# Patient Record
Sex: FEMALE | Race: WHITE | URBAN - METROPOLITAN AREA
[De-identification: names, ages, dates, MRNs, and addresses within clinical notes are randomized per-mention and may not be internally consistent; named-entity substitution may affect disease eponyms.]

---

## 2018-02-23 ENCOUNTER — OFFICE VISIT (OUTPATIENT)
Dept: FAMILY MEDICINE CLINIC | Age: 23
End: 2018-02-23

## 2018-02-23 VITALS
WEIGHT: 150.8 LBS | HEART RATE: 77 BPM | TEMPERATURE: 98.7 F | RESPIRATION RATE: 16 BRPM | DIASTOLIC BLOOD PRESSURE: 80 MMHG | HEIGHT: 64 IN | SYSTOLIC BLOOD PRESSURE: 122 MMHG | BODY MASS INDEX: 25.74 KG/M2 | OXYGEN SATURATION: 98 %

## 2018-02-23 DIAGNOSIS — F41.0 PANIC DISORDER: Primary | ICD-10-CM

## 2018-02-23 DIAGNOSIS — L30.9 ECZEMA, UNSPECIFIED TYPE: ICD-10-CM

## 2018-02-23 DIAGNOSIS — E66.3 OVERWEIGHT (BMI 25.0-29.9): ICD-10-CM

## 2018-02-23 RX ORDER — VENLAFAXINE HYDROCHLORIDE 37.5 MG/1
37.5 CAPSULE, EXTENDED RELEASE ORAL DAILY
Qty: 30 CAP | Refills: 0 | Status: SHIPPED | OUTPATIENT
Start: 2018-02-23 | End: 2018-03-12 | Stop reason: SDUPTHER

## 2018-02-23 RX ORDER — TRIAMCINOLONE ACETONIDE 1 MG/G
CREAM TOPICAL
Qty: 15 G | Refills: 3 | Status: SHIPPED | OUTPATIENT
Start: 2018-02-23

## 2018-02-23 RX ORDER — ALPRAZOLAM 0.25 MG/1
0.25 TABLET ORAL
Qty: 45 TAB | Refills: 0 | Status: SHIPPED | OUTPATIENT
Start: 2018-02-23

## 2018-02-23 NOTE — PATIENT INSTRUCTIONS
Begin Venlafaxine 37.5 mg daily  Alprazolam 0.25 mg up to 3 x daily only if needed for anxiety  Return for follow up in 3 weeks, sooner with any problems.   Referral to [address]  Triamcinolone - Apply sparingly to affected areas twice daily, do not use for more than 14 days consecutively without a break

## 2018-02-23 NOTE — PROGRESS NOTES
HISTORY OF PRESENT ILLNESS  Heraclio Norman is a 23 y.o. female. HPI Comments: Ms. Alvino Mancini was seen here in 3/2016 with anxiety/panic symptoms, started on sertraline and alprazolam and referred for psychiatry evaluation. She now reports that the sertraline might have been associated with a rash on her legs and she was not comfortable at the psychiatry practice. She was subsequently seen by the psychiatrist at [facility] and was treated with escitalopram and alprazolam. She found the alprazolam helpful but felt like she was \"numb\" on escitalopram and found herself contemplating driving into walls or stepping off high places and stopped it. She has graduated from college and is no longer eligable for psychiatric care there. She has been taking alprazolam about 3 days each week and is about to run out. Dry Skin   The history is provided by the patient and medical records. This is a recurrent problem. Associated symptoms include shortness of breath (with panic attacks). Pertinent negatives include no headaches. Anxiety   The history is provided by the patient and medical records. This is a recurrent problem. Associated symptoms include shortness of breath (with panic attacks). Pertinent negatives include no headaches. Review of Systems   Constitutional: Negative for fever and weight loss. Eyes:        \"tunnel vision\" with panic attacks   Respiratory: Positive for shortness of breath (with panic attacks). Cardiovascular: Positive for palpitations (with panic attacks). Skin: Positive for dry skin and rash (eczema). Neurological: Negative for headaches. Psychiatric/Behavioral: Negative for depression and suicidal ideas. The patient is nervous/anxious (panic). The patient does not have insomnia.       Visit Vitals    /80 (BP 1 Location: Left arm, BP Patient Position: Sitting)    Pulse 77    Temp 98.7 °F (37.1 °C) (Oral)    Resp 16    Ht 5' 4\" (1.626 m)    Wt 150 lb 12.8 oz (68.4 kg)    SpO2 98%  BMI 25.88 kg/m2     Physical Exam   Constitutional: She is oriented to person, place, and time. She appears well-developed and well-nourished. 32 lb weight gain since 3/2016   HENT:   Head: Normocephalic. Right Ear: Tympanic membrane and ear canal normal.   Left Ear: Tympanic membrane and ear canal normal.   Mouth/Throat: Oropharynx is clear and moist.   Eyes: Conjunctivae and EOM are normal.   Neck: Neck supple. Cardiovascular: Normal rate, regular rhythm and normal heart sounds. Pulmonary/Chest: Effort normal and breath sounds normal.   Lymphadenopathy:     She has no cervical adenopathy. Neurological: She is alert and oriented to person, place, and time. Skin: Skin is warm and dry. Rash (isolated patches of eczema) noted. Psychiatric: She has a normal mood and affect. Her behavior is normal.   Nursing note and vitals reviewed. ASSESSMENT and PLAN    ICD-10-CM ICD-9-CM    1. Panic disorder F41.0 300.01 venlafaxine-SR (EFFEXOR-XR) 37.5 mg capsule      ALPRAZolam (XANAX) 0.25 mg tablet      REFERRAL TO PSYCHIATRY   2. Eczema, unspecified type L30.9 692.9 triamcinolone acetonide (KENALOG) 0.1 % topical cream   3. Overweight (BMI 25.0-29. 9) E66.3 278.02    Begin Venlafaxine 37.5 mg daily  [facility] reviewed with no information indicating activity of concern. Alprazolam 0.25 mg up to 3 x daily only if needed for anxiety  Return for follow up in 3 weeks, sooner with any problems. Referral to [facility]  Triamcinolone - Apply sparingly to affected areas twice daily, do not use for more than 14 days consecutively without a break  Avoid dietary starch and sugar and follow a program of regular aerobic exercise.

## 2018-02-23 NOTE — PROGRESS NOTES
Go Diallo is a 23 y.o. female here for eczema    Go Diallo is a 23 y.o. female (: 1995) presenting to address:    Chief Complaint   Patient presents with    Dry Skin     pt states for a few weeks she's had break outs    Anxiety     pt states she has concerns with anxiety       Vitals:    18 0948   BP: 122/80   Pulse: 77   Resp: 16   SpO2: 98%   Weight: 150 lb 12.8 oz (68.4 kg)   Height: 5' 4\" (1.626 m)   PainSc:   0 - No pain       Hearing/Vision:   No exam data present    Learning Assessment:     Learning Assessment 3/18/2016   PRIMARY LEARNER Patient   HIGHEST LEVEL OF EDUCATION - PRIMARY LEARNER  SOME COLLEGE   BARRIERS PRIMARY LEARNER NONE   CO-LEARNER CAREGIVER No   PRIMARY LANGUAGE ENGLISH   LEARNER PREFERENCE PRIMARY VIDEOS   ANSWERED BY patient   RELATIONSHIP SELF     Depression Screening:     PHQ over the last two weeks 3/18/2016   Little interest or pleasure in doing things Nearly every day   Feeling down, depressed or hopeless Nearly every day   Total Score PHQ 2 6     Fall Risk Assessment:   No flowsheet data found. Abuse Screening:   No flowsheet data found. Coordination of Care Questionaire:   1. Have you been to the ER, urgent care clinic since your last visit? Hospitalized since your last visit? YES Urgent care     2. Have you seen or consulted any other health care providers outside of the [address] [place] [address] since your last visit? Include any pap smears or colon screening. NO    Advanced Directive:   1. Do you have an Advanced Directive? NO    2. Would you like information on Advanced Directives?  NO

## 2018-02-23 NOTE — MR AVS SNAPSHOT
[address] [facility] [address] 
[phone] Patient: Tonie Lesch MRN: [id number] QTZ:[birth year] Visit Information Date & Time Provider Department Dept. Phone Encounter #  
 2/23/2018 10:00 AM Lu Joseph, [employer] [phone] [id number] Upcoming Health Maintenance Date Due  
 HPV AGE 9Y-34Y (1 of 3 - Female 3 Dose Series) 4/7/2004 DTaP/Tdap/Td series (1 - Tdap) 4/7/2014 PAP AKA CERVICAL CYTOLOGY 4/7/2014 Allergies as of 2/23/2018  Review Complete On: 2/23/2018 By: Lu Joseph MD  
  
 Severity Noted Reaction Type Reactions Citrus And Derivatives  03/18/2016    Rash Current Immunizations  Never Reviewed Name Date Influenza Vaccine 9/5/2017 Not reviewed this visit You Were Diagnosed With   
  
 Codes Comments Panic disorder    -  Primary ICD-10-CM: F41.0 ICD-9-CM: 300.01 Eczema, unspecified type     ICD-10-CM: L30.9 ICD-9-CM: 692.9 Overweight (BMI 25.0-29. 9)     ICD-10-CM: W08.6 ICD-9-CM: 278.02 Vitals BP Pulse Temp Resp Height(growth percentile) Weight(growth percentile) 122/80 (BP 1 Location: Left arm, BP Patient Position: Sitting) 77 98.7 °F (37.1 °C) (Oral) 16 5' 4\" (1.626 m) 150 lb 12.8 oz (68.4 kg) SpO2 BMI Smoking Status 98% 25.88 kg/m2 Never Smoker Vitals History BMI and BSA Data Body Mass Index Body Surface Area  
 25.88 kg/m 2 1.76 m 2 Preferred Pharmacy Pharmacy Name Phone [facility] [address] [place] [address] 263. [phone] Your Updated Medication List  
  
   
This list is accurate as of 2/23/18 10:23 AM.  Always use your most recent med list.  
  
  
  
  
 ALPRAZolam 0.25 mg tablet Commonly known as:  Deon Laboy Take 1 Tab by mouth three (3) times daily as needed for Anxiety. Max Daily Amount: 0.75 mg. CLARITIN-D 12 HOUR 5-120 mg per tablet Generic drug:  loratadine-pseudoephedrine Take 1 Tab by mouth two (2) times a day. triamcinolone acetonide 0.1 % topical cream  
Commonly known as:  KENALOG Apply sparingly to affected areas twice daily, do not use for more than 14 days consecutively without a break use thin layer  
  
 venlafaxine-SR 37.5 mg capsule Commonly known as:  EFFEXOR-XR Take 1 Cap by mouth daily. Prescriptions Printed Refills ALPRAZolam (XANAX) 0.25 mg tablet 0 Sig: Take 1 Tab by mouth three (3) times daily as needed for Anxiety. Max Daily Amount: 0.75 mg. Class: Print Route: Oral  
 triamcinolone acetonide (KENALOG) 0.1 % topical cream 3 Sig: Apply sparingly to affected areas twice daily, do not use for more than 14 days consecutively without a break 
use thin layer Class: Print Prescriptions Sent to Pharmacy Refills  
 venlafaxine-SR (EFFEXOR-XR) 37.5 mg capsule 0 Sig: Take 1 Cap by mouth daily. Class: Normal  
 Pharmacy: [facility] DR SHLOMO BOSTON [address] [place] [address].  #: [phone] Route: Oral  
  
We Performed the Following REFERRAL TO PSYCHIATRY [REF91 Custom] Comments:  
 Please evaluate patient for anxiety/panic disorder - [facility] Referral Information Referral ID Referred By Referred To  
  
 [id number] Collin Rios Not Available Visits Status Start Date End Date 1 New Request 2/23/18 2/23/19 If your referral has a status of pending review or denied, additional information will be sent to support the outcome of this decision. Patient Instructions Begin Venlafaxine 37.5 mg daily Alprazolam 0.25 mg up to 3 x daily only if needed for anxiety Return for follow up in 3 weeks, sooner with any problems. Referral to [address] Triamcinolone - Apply sparingly to affected areas twice daily, do not use for more than 14 days consecutively without a break Introducing [address]! Esdras Hale introduces [employer] patient portal. Now you can access parts of your medical record, email your doctor's office, and request medication refills online. 1. In your internet browser, go to [email or web address] 2. Click on the First Time User? Click Here link in the Sign In box. You will see the New Member Sign Up page. 3. Enter your Rew[employer]Me Access Code exactly as it appears below. You will not need to use this code after youve completed the sign-up process. If you do not sign up before the expiration date, you must request a new code. · [employer]Me Access Code: [email or web address] Expires: 5/24/2018 10:17 AM 
 
4. Enter the last four digits of your Social Security Number (xxxx) and Date of Birth (mm/dd/yyyy) as indicated and click Submit. You will be taken to the next sign-up page. 5. Create a RewardMe ID. This will be your RewardMe login ID and cannot be changed, so think of one that is secure and easy to remember. 6. Create a Rew[employer]Me password. You can change your password at any time. 7. Enter your Password Reset Question and Answer. This can be used at a later time if you forget your password. 8. Enter your e-mail address. You will receive e-mail notification when new information is available in [address] 9. Click Sign Up. You can now view and download portions of your medical record. 10. Click the Download Summary menu link to download a portable copy of your medical information. If you have questions, please visit the Frequently Asked Questions section of the [employer]Me website. Remember, [employer]Me is NOT to be used for urgent needs. For medical emergencies, dial 911. Now available from your iPhone and Android! Please provide this summary of care documentation to your next provider. Your primary care clinician is listed as Roshni Ayala.  If you have any questions after today's visit, please call [phone].

## 2018-03-12 ENCOUNTER — OFFICE VISIT (OUTPATIENT)
Dept: FAMILY MEDICINE CLINIC | Age: 23
End: 2018-03-12

## 2018-03-12 VITALS
HEIGHT: 64 IN | SYSTOLIC BLOOD PRESSURE: 104 MMHG | WEIGHT: 150.4 LBS | BODY MASS INDEX: 25.68 KG/M2 | HEART RATE: 89 BPM | TEMPERATURE: 98.3 F | OXYGEN SATURATION: 98 % | DIASTOLIC BLOOD PRESSURE: 82 MMHG | RESPIRATION RATE: 16 BRPM

## 2018-03-12 DIAGNOSIS — Z87.898 HISTORY OF NIGHT SWEATS: Primary | ICD-10-CM

## 2018-03-12 DIAGNOSIS — F41.0 PANIC DISORDER: ICD-10-CM

## 2018-03-12 RX ORDER — VENLAFAXINE HYDROCHLORIDE 37.5 MG/1
37.5 CAPSULE, EXTENDED RELEASE ORAL DAILY
Qty: 90 CAP | Refills: 1 | Status: SHIPPED | OUTPATIENT
Start: 2018-03-12

## 2018-03-12 NOTE — MR AVS SNAPSHOT
Amadou Ca 
 
 
 [address] [facility] [id number] [phone] Patient: Alpesh Zheng MRN: [id number] RCN:8[birth year]2/[birth year]324 Visit Information Date & Time Provider Department Dept. Phone Encounter #  
 3/12/2018 10:00 AM Paloma Short, [employer] [phone] [id number] Upcoming Health Maintenance Date Due  
 HPV AGE 9Y-34Y (1 of 3 - Female 3 Dose Series) 4/7/2004 DTaP/Tdap/Td series (1 - Tdap) 4/7/2014 PAP AKA CERVICAL CYTOLOGY 4/7/2014 Allergies as of 3/12/2018  Review Complete On: 3/12/2018 By: Paloma Short MD  
  
 Severity Noted Reaction Type Reactions Citrus And Derivatives  03/18/2016    Rash Current Immunizations  Never Reviewed Name Date Influenza Vaccine 9/5/2017 Not reviewed this visit You Were Diagnosed With   
  
 Codes Comments History of night sweats    -  Primary ICD-10-CM: K75.701 ICD-9-CM: V15.89 Panic disorder     ICD-10-CM: F41.0 ICD-9-CM: 300.01 Vitals BP Pulse Temp Resp Height(growth percentile) Weight(growth percentile) 104/82 (BP 1 Location: Left arm, BP Patient Position: Sitting) 89 98.3 °F (36.8 °C) (Oral) 16 5' 4\" (1.626 m) 150 lb 6.4 oz (68.2 kg) SpO2 BMI Smoking Status 98% 25.82 kg/m2 Never Smoker BMI and BSA Data Body Mass Index Body Surface Area  
 25.82 kg/m 2 1.75 m 2 Preferred Pharmacy Pharmacy Name Phone [facility] [address] [place] [address] 263. [phone] Your Updated Medication List  
  
   
This list is accurate as of 3/12/18 10:24 AM.  Always use your most recent med list.  
  
  
  
  
 ALPRAZolam 0.25 mg tablet Commonly known as:  Dayla Bake Take 1 Tab by mouth three (3) times daily as needed for Anxiety. Max Daily Amount: 0.75 mg. CLARITIN-D 12 HOUR 5-120 mg per tablet Generic drug:  loratadine-pseudoephedrine Take 1 Tab by mouth two (2) times a day. triamcinolone acetonide 0.1 % topical cream  
Commonly known as:  KENALOG Apply sparingly to affected areas twice daily, do not use for more than 14 days consecutively without a break use thin layer  
  
 venlafaxine-SR 37.5 mg capsule Commonly known as:  EFFEXOR-XR Take 1 Cap by mouth daily. Prescriptions Sent to Pharmacy Refills  
 venlafaxine-SR (EFFEXOR-XR) 37.5 mg capsule 1 Sig: Take 1 Cap by mouth daily. Class: Normal  
 Pharmacy: [facility] [address] SHLOMO BOSTON [address] [facility], [address] [facility] [address] [address].  #: [phone] Route: Oral  
  
Patient Instructions Continue current medications. Return for follow up in 6 months, sooner with any problems. Introducing [facility]! Angelica Polanco introduces [employer] patient portal. Now you can access parts of your medical record, email your doctor's office, and request medication refills online. 1. In your internet browser, go to [email or web address] 2. Click on the First Time User? Click Here link in the Sign In box. You will see the New Member Sign Up page. 3. Enter your [employer] Access Code exactly as it appears below. You will not need to use this code after youve completed the sign-up process. If you do not sign up before the expiration date, you must request a new code. · [employer] Access Code: [email or web address] Expires: 5/24/2018 11:17 AM 
 
4. Enter the last four digits of your Social Security Number (xxxx) and Date of Birth (mm/dd/yyyy) as indicated and click Submit. You will be taken to the next sign-up page. 5. Create a [employer] ID. This will be your [employer] login ID and cannot be changed, so think of one that is secure and easy to remember. 6. Create a [employer] password. You can change your password at any time. 7. Enter your Password Reset Question and Answer. This can be used at a later time if you forget your password. 8. Enter your e-mail address. You will receive e-mail notification when new information is available in [address] 9. Click Sign Up. You can now view and download portions of your medical record. 10. Click the Download Summary menu link to download a portable copy of your medical information. If you have questions, please visit the Frequently Asked Questions section of the [employer] website. Remember, [employer] is NOT to be used for urgent needs. For medical emergencies, dial 911. Now available from your iPhone and Android! Please provide this summary of care documentation to your next provider. Your primary care clinician is listed as Todd Nixon. If you have any questions after today's visit, please call [phone].

## 2018-03-12 NOTE — PROGRESS NOTES
HISTORY OF PRESENT ILLNESS  Marialuisa Oliveira is a 23 y.o. female. Anxiety   The history is provided by the patient and medical records. This is a chronic problem. Pertinent negatives include no chest pain, no abdominal pain and no headaches. Patient Active Problem List   Diagnosis Code    Panic disorder F41.0    Family history of breast cancer in mother Dx age 45 Z80.2   [facility] Overweight (BMI 25.0-29. 9) E66.3       Current Outpatient Prescriptions:     loratadine-pseudoephedrine (CLARITIN-D 12 HOUR) 5-120 mg per tablet, Take 1 Tab by mouth two (2) times a day., Disp: , Rfl:     venlafaxine-SR (EFFEXOR-XR) 37.5 mg capsule, Take 1 Cap by mouth daily. , Disp: 30 Cap, Rfl: 0    ALPRAZolam (XANAX) 0.25 mg tablet, Take 1 Tab by mouth three (3) times daily as needed for Anxiety. Max Daily Amount: 0.75 mg., Disp: 45 Tab, Rfl: 0    triamcinolone acetonide (KENALOG) 0.1 % topical cream, Apply sparingly to affected areas twice daily, do not use for more than 14 days consecutively without a break use thin layer, Disp: 15 g, Rfl: 3    Allergies   Allergen Reactions    Citrus And Derivatives Rash         Review of Systems   Constitutional: Negative for fever and weight loss. Reports frequent night sweats x 2 years. Has had a negative PPD since, denies symptoms of infection, reports decreased frequency of symptoms since starting venlafaxine. Cardiovascular: Negative for chest pain and palpitations. Gastrointestinal: Negative for abdominal pain and nausea. Neurological: Negative for dizziness and headaches. Psychiatric/Behavioral: Negative for depression. The patient is nervous/anxious (much less anxiety/panic symptoms on venlafaxine - only felt anxious during an argument since starting venlafaxine, no longer needing alprazolam).       Visit Vitals    /82 (BP 1 Location: Left arm, BP Patient Position: Sitting)    Pulse 89    Temp 98.3 °F (36.8 °C) (Oral)    Resp 16    Ht 5' 4\" (1.626 m)    Wt 150 lb 6.4 oz (68.2 kg)    SpO2 98%    BMI 25.82 kg/m2       Physical Exam   Constitutional: She is oriented to person, place, and time. She appears well-developed and well-nourished. HENT:   Head: Normocephalic. Eyes: EOM are normal.   Neck: Neck supple. Cardiovascular: Normal rate. Pulmonary/Chest: Effort normal.   Musculoskeletal: She exhibits no edema. Neurological: She is alert and oriented to person, place, and time. Skin: Skin is warm and dry. Psychiatric: She has a normal mood and affect. Her behavior is normal.   Nursing note and vitals reviewed. ASSESSMENT and PLAN    ICD-10-CM ICD-9-CM    1. History of night sweats Z87.898 V15.89    2. Panic disorder F41.0 300.01 venlafaxine-SR (EFFEXOR-XR) 37.5 mg capsule   Continue current medications. Return for follow up in 6 months, sooner with any problems.

## 2018-03-12 NOTE — PROGRESS NOTES
Parke Lombard is a 23 y.o. female here for follow up      Parke Lombard is a 23 y.o. female (: 1995) presenting to address:    Chief Complaint   Patient presents with    Anxiety     pt states she's here for a follow up. meds zane well        Vitals:    18 1000   BP: 104/82   Pulse: 89   Resp: 16   Temp: 98.3 °F (36.8 °C)   TempSrc: Oral   SpO2: 98%   Weight: 150 lb 6.4 oz (68.2 kg)   Height: 5' 4\" (1.626 m)   PainSc:   0 - No pain       Hearing/Vision:   No exam data present    Learning Assessment:     Learning Assessment 3/18/2016   PRIMARY LEARNER Patient   HIGHEST LEVEL OF EDUCATION - PRIMARY LEARNER  SOME COLLEGE   BARRIERS PRIMARY LEARNER NONE   CO-LEARNER CAREGIVER No   PRIMARY LANGUAGE ENGLISH   LEARNER PREFERENCE PRIMARY VIDEOS   ANSWERED BY patient   RELATIONSHIP SELF     Depression Screening:     PHQ over the last two weeks 3/18/2016   Little interest or pleasure in doing things Nearly every day   Feeling down, depressed or hopeless Nearly every day   Total Score PHQ 2 6     Fall Risk Assessment:   No flowsheet data found. Abuse Screening:   No flowsheet data found. Coordination of Care Questionaire:   1. Have you been to the ER, urgent care clinic since your last visit? Hospitalized since your last visit? NO    2. Have you seen or consulted any other health care providers outside of the [facility] [place] since your last visit? Include any pap smears or colon screening. NO    Advanced Directive:   1. Do you have an Advanced Directive? NO    2. Would you like information on Advanced Directives?  NO

## 2021-03-09 ENCOUNTER — APPOINTMENT (OUTPATIENT)
Age: 26
Setting detail: DERMATOLOGY
End: 2021-03-17

## 2021-03-09 DIAGNOSIS — R21 RASH AND OTHER NONSPECIFIC SKIN ERUPTION: ICD-10-CM

## 2021-03-09 DIAGNOSIS — D485 NEOPLASM OF UNCERTAIN BEHAVIOR OF SKIN: ICD-10-CM

## 2021-03-09 PROBLEM — D48.5 NEOPLASM OF UNCERTAIN BEHAVIOR OF SKIN: Status: ACTIVE | Noted: 2021-03-09

## 2021-03-09 PROCEDURE — OTHER DEFER: OTHER

## 2021-03-09 PROCEDURE — OTHER OBSERVATION: OTHER

## 2021-03-09 PROCEDURE — OTHER COUNSELING: OTHER

## 2021-03-09 PROCEDURE — 99203 OFFICE O/P NEW LOW 30 MIN: CPT

## 2021-03-09 PROCEDURE — OTHER ADDITIONAL NOTES: OTHER

## 2021-03-09 PROCEDURE — OTHER TREATMENT REGIMEN: OTHER

## 2021-03-09 ASSESSMENT — LOCATION DETAILED DESCRIPTION DERM
LOCATION DETAILED: LEFT ELBOW
LOCATION DETAILED: RIGHT ELBOW
LOCATION DETAILED: RIGHT PROXIMAL POSTERIOR UPPER ARM

## 2021-03-09 ASSESSMENT — LOCATION ZONE DERM: LOCATION ZONE: ARM

## 2021-03-09 ASSESSMENT — LOCATION SIMPLE DESCRIPTION DERM
LOCATION SIMPLE: LEFT ELBOW
LOCATION SIMPLE: RIGHT ELBOW
LOCATION SIMPLE: RIGHT POSTERIOR UPPER ARM

## 2021-03-09 NOTE — PROCEDURE: ADDITIONAL NOTES
Detail Level: Simple
Additional Notes: Right upper arm - lesion present for about 2 years without changes in size. Pt denies symptoms. Pt reports occasionally the spot will be slightly flakey. I discussed diff. Diagnoses with pt and offered bx for a more definitive diagnosis but pt declined today and prefers to monitor spot. Plan to reevaluate the spot in 2-3 months, sooner with any changes.
Render Risk Assessment In Note?: no

## 2021-03-09 NOTE — PROCEDURE: TREATMENT REGIMEN
Continue Regimen: Continue clobetasol cream bid prn to affected areas, do not use for longer than 2 weeks at a time.
Detail Level: Zone
Plan: Rash present on elbows today. Pt reports she previously had this rash on her ears and forehead but cleared within days of starting clobetasol cream prescribed by PCP. Pt also had rash on the back of her neck but not thought it was eczema. Pt also reports joint pain in her hips but pt has scoliosis so unsure if related. I noticed a few pits in her fingernails which leads me to favor psoriasis today. Pt denies a family hx of psoriasis. I discussed diagnosis with pt. Recommend starting clobetasol bid prn to affected areas on elbows, do not use for extended periods of time due to risk of skin atrophy with overuse. Stressed importance of avoiding clobetasol on the face, if face flares again recommend using OTC hydrocortisone or calling our office and we can send mometasone cream for pt. Also recommend pt follow up with rheumatologist for further evaluation of joint pain, discussed possibility of psoriatic arthritis. Follow up in 2-3 months, sooner with any changes.

## 2021-06-10 ENCOUNTER — APPOINTMENT (OUTPATIENT)
Age: 26
Setting detail: DERMATOLOGY
End: 2021-06-11

## 2021-06-10 DIAGNOSIS — Z71.89 OTHER SPECIFIED COUNSELING: ICD-10-CM

## 2021-06-10 DIAGNOSIS — D22 MELANOCYTIC NEVI: ICD-10-CM

## 2021-06-10 DIAGNOSIS — L60.8 OTHER NAIL DISORDERS: ICD-10-CM

## 2021-06-10 DIAGNOSIS — L40.0 PSORIASIS VULGARIS: ICD-10-CM

## 2021-06-10 DIAGNOSIS — L81.4 OTHER MELANIN HYPERPIGMENTATION: ICD-10-CM

## 2021-06-10 PROBLEM — D22.0 MELANOCYTIC NEVI OF LIP: Status: ACTIVE | Noted: 2021-06-10

## 2021-06-10 PROBLEM — D22.72 MELANOCYTIC NEVI OF LEFT LOWER LIMB, INCLUDING HIP: Status: ACTIVE | Noted: 2021-06-10

## 2021-06-10 PROCEDURE — OTHER SUNSCREEN RECOMMENDATIONS: OTHER

## 2021-06-10 PROCEDURE — OTHER COUNSELING: TOPICAL STEROIDS: OTHER

## 2021-06-10 PROCEDURE — OTHER RECOMMENDATIONS: OTHER

## 2021-06-10 PROCEDURE — OTHER MIPS QUALITY: OTHER

## 2021-06-10 PROCEDURE — OTHER REASSURANCE: OTHER

## 2021-06-10 PROCEDURE — OTHER PRESCRIPTION: OTHER

## 2021-06-10 PROCEDURE — OTHER COUNSELING: OTHER

## 2021-06-10 PROCEDURE — 99213 OFFICE O/P EST LOW 20 MIN: CPT

## 2021-06-10 RX ORDER — CLOBETASOL PROPIONATE 0.5 MG/G
CREAM TOPICAL
Qty: 1 | Refills: 2 | Status: ERX | COMMUNITY
Start: 2021-06-10

## 2021-06-10 ASSESSMENT — LOCATION SIMPLE DESCRIPTION DERM
LOCATION SIMPLE: LEFT LIP
LOCATION SIMPLE: LEFT ELBOW
LOCATION SIMPLE: LEFT RING FINGERNAIL
LOCATION SIMPLE: LEFT THUMBNAIL
LOCATION SIMPLE: RIGHT RING FINGERNAIL
LOCATION SIMPLE: RIGHT ELBOW
LOCATION SIMPLE: RIGHT SMALL FINGERNAIL
LOCATION SIMPLE: LEFT KNEE
LOCATION SIMPLE: LEFT MIDDLE FINGERNAIL
LOCATION SIMPLE: LEFT FOREARM
LOCATION SIMPLE: LEFT THIGH
LOCATION SIMPLE: RIGHT INDEX FINGERNAIL
LOCATION SIMPLE: RIGHT FOREARM
LOCATION SIMPLE: RIGHT THUMBNAIL

## 2021-06-10 ASSESSMENT — LOCATION DETAILED DESCRIPTION DERM
LOCATION DETAILED: RIGHT SMALL FINGERNAIL
LOCATION DETAILED: LEFT MIDDLE FINGERNAIL
LOCATION DETAILED: RIGHT RING FINGERNAIL
LOCATION DETAILED: LEFT PROXIMAL DORSAL FOREARM
LOCATION DETAILED: LEFT MEDIAL KNEE
LOCATION DETAILED: LEFT RING FINGERNAIL
LOCATION DETAILED: LEFT ELBOW
LOCATION DETAILED: RIGHT INDEX FINGERNAIL
LOCATION DETAILED: LEFT THUMBNAIL
LOCATION DETAILED: LEFT LOWER CUTANEOUS LIP
LOCATION DETAILED: RIGHT DISTAL DORSAL FOREARM
LOCATION DETAILED: LEFT ANTERIOR LATERAL DISTAL THIGH
LOCATION DETAILED: RIGHT THUMBNAIL
LOCATION DETAILED: RIGHT ELBOW

## 2021-06-10 ASSESSMENT — BSA PSORIASIS: % BODY COVERED IN PSORIASIS: 0

## 2021-06-10 ASSESSMENT — LOCATION ZONE DERM
LOCATION ZONE: FINGERNAIL
LOCATION ZONE: LEG
LOCATION ZONE: ARM
LOCATION ZONE: LIP

## 2021-06-10 NOTE — PROCEDURE: MIPS QUALITY
Quality 154 Part B: Falls: Risk Screening (Should Be Reported With Measure 155.): Patient screened for future fall risk; documentation of no falls in the past year or only one fall without injury in the past year
Quality 154 Part A: Falls: Risk Assessment (Should Be Reported With Measure 155.): Falls risk assessment completed and documented in the past 12 months.
Quality 317: Preventative Care And Screening: Screening For High Blood Pressure And Follow-Up Documented: Patient refuses to participate (either BP measurement or follow-up).
Quality 400a: One-Time Screening For Hepatitis C Virus (Hcv) For All Patients: Patient received one-time screening for HCV infection
Quality 358: Patient-Centered Surgical Risk Assessment And Communication: Documentation of patient-specific risk assessment with a risk calculator based on multi-institutional clinical data, the specific risk calculator used, and communication of risk assessment from risk calculator with the patient or family.
Quality 134: Screening For Clinical Depression And Follow-Up Plan: The patient was screened for depression and the screen was negative and no follow up required
Quality 128: Preventive Care And Screening: Body Mass Index (Bmi) Screening And Follow-Up Plan: BMI not documented, reason not otherwise specified.
Detail Level: Detailed
Quality 130: Documentation Of Current Medications In The Medical Record: Current Medications Documented
Quality 265: Biopsy Follow-Up: Biopsy results reviewed, communicated, tracked, and documented
Quality 110: Preventive Care And Screening: Influenza Immunization: Influenza Immunization previously received during influenza season
Quality 226: Preventive Care And Screening: Tobacco Use: Screening And Cessation Intervention: Patient screened for tobacco use and is an ex/non-smoker
Quality 431: Preventive Care And Screening: Unhealthy Alcohol Use - Screening: Patient screened for unhealthy alcohol use using a single question and scores less than 2 times per year

## 2021-06-10 NOTE — PROCEDURE: RECOMMENDATIONS
Recommendation Preamble: The following recommendations were made during the visit:
Recommendations (Free Text): Per pt she had the clobetasol 0.025% cream that she was prescribed by another provided but it is non-formulary so will send clobetasol 0.05% cream as pt needs a refill. Pt reports she is applying this less than once a week.
Detail Level: Zone
Render Risk Assessment In Note?: no

## 2021-10-12 ENCOUNTER — APPOINTMENT (OUTPATIENT)
Age: 26
Setting detail: DERMATOLOGY
End: 2021-10-12

## 2021-10-12 DIAGNOSIS — L81.4 OTHER MELANIN HYPERPIGMENTATION: ICD-10-CM

## 2021-10-12 DIAGNOSIS — L40.0 PSORIASIS VULGARIS: ICD-10-CM

## 2021-10-12 DIAGNOSIS — L60.8 OTHER NAIL DISORDERS: ICD-10-CM

## 2021-10-12 DIAGNOSIS — Z71.89 OTHER SPECIFIED COUNSELING: ICD-10-CM

## 2021-10-12 PROBLEM — D23.61 OTHER BENIGN NEOPLASM OF SKIN OF RIGHT UPPER LIMB, INCLUDING SHOULDER: Status: ACTIVE | Noted: 2021-10-12

## 2021-10-12 PROCEDURE — OTHER REASSURANCE: OTHER

## 2021-10-12 PROCEDURE — OTHER DEFER: OTHER

## 2021-10-12 PROCEDURE — OTHER COUNSELING: TOPICAL STEROIDS: OTHER

## 2021-10-12 PROCEDURE — OTHER SUNSCREEN RECOMMENDATIONS: OTHER

## 2021-10-12 PROCEDURE — OTHER COUNSELING: OTHER

## 2021-10-12 PROCEDURE — 99213 OFFICE O/P EST LOW 20 MIN: CPT

## 2021-10-12 PROCEDURE — OTHER MIPS QUALITY: OTHER

## 2021-10-12 PROCEDURE — OTHER RECOMMENDATIONS: OTHER

## 2021-10-12 PROCEDURE — OTHER OBSERVATION: OTHER

## 2021-10-12 ASSESSMENT — LOCATION SIMPLE DESCRIPTION DERM
LOCATION SIMPLE: RIGHT FOREARM
LOCATION SIMPLE: LEFT ELBOW
LOCATION SIMPLE: RIGHT ELBOW
LOCATION SIMPLE: LEFT FOREARM
LOCATION SIMPLE: LEFT INDEX FINGERNAIL
LOCATION SIMPLE: LEFT MIDDLE FINGERNAIL

## 2021-10-12 ASSESSMENT — LOCATION DETAILED DESCRIPTION DERM
LOCATION DETAILED: LEFT INDEX FINGERNAIL
LOCATION DETAILED: LEFT PROXIMAL DORSAL FOREARM
LOCATION DETAILED: LEFT ELBOW
LOCATION DETAILED: LEFT MIDDLE FINGERNAIL
LOCATION DETAILED: RIGHT DISTAL DORSAL FOREARM
LOCATION DETAILED: RIGHT ELBOW

## 2021-10-12 ASSESSMENT — BSA PSORIASIS: % BODY COVERED IN PSORIASIS: 0

## 2021-10-12 ASSESSMENT — LOCATION ZONE DERM
LOCATION ZONE: ARM
LOCATION ZONE: FINGERNAIL

## 2021-10-12 NOTE — PROCEDURE: RECOMMENDATIONS
Render Risk Assessment In Note?: no
Recommendations (Free Text): Per pt she had the clobetasol 0.025% cream that she was prescribed by another provided but it is non-formulary so will send clobetasol 0.05% cream as pt needs a refill. Pt reports she is applying this less than once a week.\\nNo active psoriasis noted today. Pt instructed to call if psoriasis flares in between appts. Discussed possibility of diet and trying a gluten free diet this winter. Pt reports her psoriasis seems to be worse in the winter time
Recommendation Preamble: The following recommendations were made during the visit:
Detail Level: Zone

## 2021-10-12 NOTE — PROCEDURE: MIPS QUALITY
Detail Level: Detailed
Quality 128: Preventive Care And Screening: Body Mass Index (Bmi) Screening And Follow-Up Plan: BMI not documented, reason not otherwise specified.
Quality 431: Preventive Care And Screening: Unhealthy Alcohol Use - Screening: Patient screened for unhealthy alcohol use using a single question and scores less than 2 times per year
Quality 154 Part A: Falls: Risk Assessment (Should Be Reported With Measure 155.): Falls risk assessment completed and documented in the past 12 months.
Quality 226: Preventive Care And Screening: Tobacco Use: Screening And Cessation Intervention: Patient screened for tobacco use and is an ex/non-smoker
Quality 110: Preventive Care And Screening: Influenza Immunization: Influenza Immunization previously received during influenza season
Quality 400a: One-Time Screening For Hepatitis C Virus (Hcv) For All Patients: Patient received one-time screening for HCV infection
Quality 130: Documentation Of Current Medications In The Medical Record: Current Medications Documented
Quality 265: Biopsy Follow-Up: Biopsy results reviewed, communicated, tracked, and documented
Quality 154 Part B: Falls: Risk Screening (Should Be Reported With Measure 155.): Patient screened for future fall risk; documentation of no falls in the past year or only one fall without injury in the past year
Quality 358: Patient-Centered Surgical Risk Assessment And Communication: Documentation of patient-specific risk assessment with a risk calculator based on multi-institutional clinical data, the specific risk calculator used, and communication of risk assessment from risk calculator with the patient or family.
Quality 134: Screening For Clinical Depression And Follow-Up Plan: The patient was screened for depression and the screen was negative and no follow up required
Quality 317: Preventative Care And Screening: Screening For High Blood Pressure And Follow-Up Documented: Patient refuses to participate (either BP measurement or follow-up).

## 2024-07-23 ENCOUNTER — APPOINTMENT (RX ONLY)
Dept: URBAN - METROPOLITAN AREA CLINIC 139 | Facility: CLINIC | Age: 29
Setting detail: DERMATOLOGY
End: 2024-07-23

## 2024-07-23 DIAGNOSIS — D18.0 HEMANGIOMA: ICD-10-CM

## 2024-07-23 DIAGNOSIS — L81.4 OTHER MELANIN HYPERPIGMENTATION: ICD-10-CM

## 2024-07-23 DIAGNOSIS — D22 MELANOCYTIC NEVI: ICD-10-CM

## 2024-07-23 PROBLEM — D22.62 MELANOCYTIC NEVI OF LEFT UPPER LIMB, INCLUDING SHOULDER: Status: ACTIVE | Noted: 2024-07-23

## 2024-07-23 PROBLEM — D22.5 MELANOCYTIC NEVI OF TRUNK: Status: ACTIVE | Noted: 2024-07-23

## 2024-07-23 PROBLEM — D22.72 MELANOCYTIC NEVI OF LEFT LOWER LIMB, INCLUDING HIP: Status: ACTIVE | Noted: 2024-07-23

## 2024-07-23 PROBLEM — D18.01 HEMANGIOMA OF SKIN AND SUBCUTANEOUS TISSUE: Status: ACTIVE | Noted: 2024-07-23

## 2024-07-23 PROBLEM — D23.61 OTHER BENIGN NEOPLASM OF SKIN OF RIGHT UPPER LIMB, INCLUDING SHOULDER: Status: ACTIVE | Noted: 2024-07-23

## 2024-07-23 PROCEDURE — ? FULL BODY SKIN EXAM

## 2024-07-23 PROCEDURE — ? PRESCRIPTION MEDICATION MANAGEMENT

## 2024-07-23 PROCEDURE — ? COUNSELING

## 2024-07-23 PROCEDURE — ? ADDITIONAL NOTES

## 2024-07-23 PROCEDURE — 99203 OFFICE O/P NEW LOW 30 MIN: CPT

## 2024-07-23 ASSESSMENT — LOCATION DETAILED DESCRIPTION DERM
LOCATION DETAILED: LEFT DISTAL POSTERIOR THIGH
LOCATION DETAILED: RIGHT KNEE
LOCATION DETAILED: LEFT MID-UPPER BACK
LOCATION DETAILED: PERIUMBILICAL SKIN
LOCATION DETAILED: UPPER STERNUM
LOCATION DETAILED: LEFT PROXIMAL DORSAL FOREARM
LOCATION DETAILED: LEFT PROXIMAL RADIAL DORSAL FOREARM
LOCATION DETAILED: LEFT DISTAL DORSAL FOREARM
LOCATION DETAILED: EPIGASTRIC SKIN

## 2024-07-23 ASSESSMENT — LOCATION ZONE DERM
LOCATION ZONE: LEG
LOCATION ZONE: ARM
LOCATION ZONE: TRUNK

## 2024-07-23 ASSESSMENT — LOCATION SIMPLE DESCRIPTION DERM
LOCATION SIMPLE: CHEST
LOCATION SIMPLE: LEFT UPPER BACK
LOCATION SIMPLE: RIGHT KNEE
LOCATION SIMPLE: ABDOMEN
LOCATION SIMPLE: LEFT POSTERIOR THIGH
LOCATION SIMPLE: LEFT FOREARM